# Patient Record
Sex: FEMALE | Race: WHITE | NOT HISPANIC OR LATINO | URBAN - METROPOLITAN AREA
[De-identification: names, ages, dates, MRNs, and addresses within clinical notes are randomized per-mention and may not be internally consistent; named-entity substitution may affect disease eponyms.]

---

## 2018-07-09 ENCOUNTER — OFFICE VISIT (OUTPATIENT)
Dept: URBAN - METROPOLITAN AREA CLINIC 32 | Facility: CLINIC | Age: 73
End: 2018-07-09
Payer: COMMERCIAL

## 2018-07-09 PROCEDURE — 92134 CPTRZ OPH DX IMG PST SGM RTA: CPT | Performed by: OPHTHALMOLOGY

## 2018-07-09 PROCEDURE — 67028 INJECTION EYE DRUG: CPT | Performed by: OPHTHALMOLOGY

## 2018-07-09 PROCEDURE — 99213 OFFICE O/P EST LOW 20 MIN: CPT | Performed by: OPHTHALMOLOGY

## 2018-07-09 ASSESSMENT — INTRAOCULAR PRESSURE
OS: 20
OD: 20

## 2018-07-09 NOTE — IMPRESSION/PLAN
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 OD>>OS s/p MAP OS 08/29/16, 01/23/17 OS. S/P AV OD #1 05/21/2018 Plan: Discussed diagnosis in detail with patient. Discussed risks of progression. Based on today's exam, diagnostic studies and review of records, recommend to continue with KRISTYN tx to help reduce the fluid in order to prevent a further reduction in vision. An examination that was significantly and separately identifiable from the procedure was performed today. Discussed the risks and benefits of tx. Patient elects to proceed with recommendation.  OCT shows mild decrease in CMT OD, mild increase in CMT OS

## 2018-07-09 NOTE — IMPRESSION/PLAN
Impression: Vitreous degeneration, right eye: H43.811. OD. Plan: Discussed diagnosis in detail with patient. No treatment is required at this time. Will continue to observe condition and or symptoms. Discussed risks of progression. Call if [address] worsens.

## 2018-08-06 ENCOUNTER — OFFICE VISIT (OUTPATIENT)
Dept: URBAN - METROPOLITAN AREA CLINIC 32 | Facility: CLINIC | Age: 73
End: 2018-08-06
Payer: COMMERCIAL

## 2018-08-06 PROCEDURE — 99213 OFFICE O/P EST LOW 20 MIN: CPT | Performed by: OPHTHALMOLOGY

## 2018-08-06 PROCEDURE — 92134 CPTRZ OPH DX IMG PST SGM RTA: CPT | Performed by: OPHTHALMOLOGY

## 2018-08-06 ASSESSMENT — INTRAOCULAR PRESSURE
OD: 18
OS: 18

## 2018-08-06 NOTE — IMPRESSION/PLAN
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 OD>>OS s/p MAP OS 08/29/16, 01/23/17 OS. S/P AV OD #1 05/21/2018 Plan: Discussed diagnosis in detail with patient. Discussed risks of progression. Based on today's exam, diagnostic studies and review of records, recommend to continue with KRISTYN tx in BOTH eyes to help reduce the fluid in order to prevent a further reduction in vision. An examination that was significantly and separately identifiable from the procedure was performed today. Discussed the risks and benefits of tx. Patient elects to proceed with recommendation. OCT shows mild decrease in CMT, ILM changes OD, no change from last month scan OS.

## 2018-08-06 NOTE — IMPRESSION/PLAN
Impression: Vitreous degeneration, right eye: H43.811. OD. Plan: Discussed diagnosis in detail with patient. No treatment is required at this time. Will continue to observe condition and or symptoms. Discussed risks of progression. Call if [place] worsens.

## 2018-10-01 ENCOUNTER — OFFICE VISIT (OUTPATIENT)
Dept: URBAN - METROPOLITAN AREA CLINIC 32 | Facility: CLINIC | Age: 73
End: 2018-10-01
Payer: MEDICARE

## 2018-10-01 DIAGNOSIS — H43.811 VITREOUS DEGENERATION, RIGHT EYE: ICD-10-CM

## 2018-10-01 PROCEDURE — 92134 CPTRZ OPH DX IMG PST SGM RTA: CPT | Performed by: OPHTHALMOLOGY

## 2018-10-01 PROCEDURE — 99213 OFFICE O/P EST LOW 20 MIN: CPT | Performed by: OPHTHALMOLOGY

## 2018-10-01 ASSESSMENT — INTRAOCULAR PRESSURE
OD: 14
OS: 14

## 2018-10-01 NOTE — IMPRESSION/PLAN
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 OD>>OS s/p MAP OS 08/29/16, 01/23/17 OS. S/P AV OD #2 8/9/18,  #1 05/21/2018 Plan: Discussed diagnosis in detail with patient. Discussed risks of progression. Based on today's exam, diagnostic studies and review of records, recommend to continue with KRISTYN tx in BOTH eyes to help reduce the fluid in order to prevent a further reduction in vision. An examination that was significantly and separately identifiable from the procedure was performed today. Discussed the risks and benefits of tx. Patient elects to proceed with recommendation. OCT shows persistent edema OD, decrease in edema OS. Pt understands that we may need to consider other treatments such as laser or different medications if no response to Avastin.

## 2018-11-26 ENCOUNTER — OFFICE VISIT (OUTPATIENT)
Dept: URBAN - METROPOLITAN AREA CLINIC 32 | Facility: CLINIC | Age: 73
End: 2018-11-26
Payer: MEDICARE

## 2018-11-26 DIAGNOSIS — E11.3313 TYPE 2 DIAB W MODERATE NONPRLF DIAB RTNOP W MACULAR EDEMA, BILATERAL: Primary | ICD-10-CM

## 2018-11-26 PROCEDURE — 99213 OFFICE O/P EST LOW 20 MIN: CPT | Performed by: OPHTHALMOLOGY

## 2018-11-26 PROCEDURE — 92134 CPTRZ OPH DX IMG PST SGM RTA: CPT | Performed by: OPHTHALMOLOGY

## 2018-11-26 ASSESSMENT — INTRAOCULAR PRESSURE
OS: 20
OD: 20

## 2018-11-26 NOTE — IMPRESSION/PLAN
Impression: Vitreous degeneration, right eye: H43.811. OD. Plan: Discussed diagnosis in detail with patient. No treatment is required at this time. Will continue to observe condition and or symptoms. Discussed risks of progression. Call if [address] worsens.

## 2018-11-26 NOTE — IMPRESSION/PLAN
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 OD>>OS s/p MAP OS 08/29/16, 01/23/17 OS. S/P AV OU 10/1/2018 Plan: Discussed diagnosis in detail with patient. Discussed risks of progression. Based on today's exam, diagnostic studies and review of records, recommend to start with  [place]  KRISTYN tx in BOTH eyes in order to help reduce the swelling and prevent a further reduction in vision. An examination that was significantly and separately identifiable from the procedure was performed today. Further, Recommend Focal Laser treatment in the RIGHT eye to help further  reduce the swelling. Discussed the risks and benefits of tx. Patient elects to proceed with recommendation. OCT shows decrease in CMT OU. Patient may need additional KRISTYN tx or laser treatment in the future. Explained to patient and her son that vision will never be 100% free of blur. There is permanent diabetic changes that have affected the vision. Further, this is a slow process. Advised if patient is to have a refraction, vision is likely to change and require changes frequently.

## 2019-01-03 ENCOUNTER — OFFICE VISIT (OUTPATIENT)
Dept: URBAN - METROPOLITAN AREA CLINIC 32 | Facility: CLINIC | Age: 74
End: 2019-01-03
Payer: COMMERCIAL

## 2019-01-03 DIAGNOSIS — H52.4 PRESBYOPIA: ICD-10-CM

## 2019-01-03 PROCEDURE — 92015 DETERMINE REFRACTIVE STATE: CPT | Performed by: OPTOMETRIST

## 2019-01-03 PROCEDURE — 99214 OFFICE O/P EST MOD 30 MIN: CPT | Performed by: OPTOMETRIST

## 2019-01-03 ASSESSMENT — INTRAOCULAR PRESSURE
OS: 23
OD: 23

## 2019-01-03 ASSESSMENT — VISUAL ACUITY
OD: 20/60
OS: 20/150

## 2019-01-03 NOTE — IMPRESSION/PLAN
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 OD>>OS s/p MAP OS 08/29/16, 01/23/17 OS. S/P AV OU 10/1/2018 Plan: Discussed diagnosis in detail with patient. Discussed risks of progression. Cont with referring provider, gave high add bifocal Rx RTC 1 year

## 2019-01-08 ENCOUNTER — SURGERY (OUTPATIENT)
Dept: URBAN - METROPOLITAN AREA SURGERY 11 | Facility: SURGERY | Age: 74
End: 2019-01-08
Payer: MEDICARE

## 2019-01-08 PROCEDURE — 67210 TREATMENT OF RETINAL LESION: CPT | Performed by: OPHTHALMOLOGY
